# Patient Record
Sex: FEMALE | Race: WHITE | Employment: FULL TIME | URBAN - METROPOLITAN AREA
[De-identification: names, ages, dates, MRNs, and addresses within clinical notes are randomized per-mention and may not be internally consistent; named-entity substitution may affect disease eponyms.]

---

## 2018-09-27 LAB
HPV ABSTRACT: ABNORMAL
PAP-ABSTRACT: NORMAL

## 2018-10-15 ENCOUNTER — TRANSFERRED RECORDS (OUTPATIENT)
Dept: HEALTH INFORMATION MANAGEMENT | Facility: CLINIC | Age: 43
End: 2018-10-15

## 2019-01-30 ENCOUNTER — TELEPHONE (OUTPATIENT)
Dept: OBGYN | Facility: CLINIC | Age: 44
End: 2019-01-30

## 2019-01-30 NOTE — TELEPHONE ENCOUNTER
Mike is a brand new patient to [facility] and would like her MIRENA removed and a new one re-inserted.  Please call to schedule at the [facility].  Thank you

## 2019-01-30 NOTE — TELEPHONE ENCOUNTER
LMTC regarding appointment for IUD removal and re-insert with JESSY Baron, CNP in [place].Cheryl Vergara RN on 1/30/2019 at 4:32 PM

## 2019-01-30 NOTE — TELEPHONE ENCOUNTER
Received phone call from pt. Appointment scheduled with JESSY Baron, CNP for 2/11/2019. Cheryl Vergara RN on 1/30/2019 at 4:56 PM

## 2019-02-11 ENCOUNTER — OFFICE VISIT (OUTPATIENT)
Dept: OBGYN | Facility: CLINIC | Age: 44
End: 2019-02-11
Payer: COMMERCIAL

## 2019-02-11 VITALS
TEMPERATURE: 98.4 F | DIASTOLIC BLOOD PRESSURE: 87 MMHG | WEIGHT: 161 LBS | BODY MASS INDEX: 26.82 KG/M2 | SYSTOLIC BLOOD PRESSURE: 139 MMHG | HEIGHT: 65 IN | HEART RATE: 78 BPM

## 2019-02-11 DIAGNOSIS — Z98.890 HISTORY OF AUGMENTATION OF BOTH BREASTS: ICD-10-CM

## 2019-02-11 DIAGNOSIS — Z30.433 ENCOUNTER FOR REMOVAL AND REINSERTION OF IUD: Primary | ICD-10-CM

## 2019-02-11 LAB — BETA HCG QUAL IFA URINE: NEGATIVE

## 2019-02-11 PROCEDURE — 84703 CHORIONIC GONADOTROPIN ASSAY: CPT | Performed by: NURSE PRACTITIONER

## 2019-02-11 PROCEDURE — 58300 INSERT INTRAUTERINE DEVICE: CPT | Performed by: NURSE PRACTITIONER

## 2019-02-11 PROCEDURE — 58301 REMOVE INTRAUTERINE DEVICE: CPT | Performed by: NURSE PRACTITIONER

## 2019-02-11 RX ORDER — LEVOTHYROXINE SODIUM 112 UG/1
112 TABLET ORAL
COMMUNITY
Start: 2018-10-16 | End: 2019-02-11

## 2019-02-11 ASSESSMENT — MIFFLIN-ST. JEOR: SCORE: 1376.17

## 2019-02-11 NOTE — Clinical Note
Please abstract the following data from this visit with this patient into the appropriate field in Epic:Mammogram done on this date: 10/15/18 (approximately), by this group: [facility], results were normal. Pap smear done on this date: 9/27/18 (approximately), by this group: [facility], results were NIL and HR HPV + type other.

## 2019-02-11 NOTE — PROGRESS NOTES
Mike Soler is a 43 year old year old who presents today requesting removal of her current IUD and placement of a Mirena IUD. Unsure exact date her current IUD is due for removal. Has abstained for 3 weeks at this time.  Also, history of bilateral breast augmentation. Would like referral to Plastic Surgeon as she would like to look at having the, replaced.    The patient meets and is agreeable to the following conditions:  She is not interested in conception in the near future.   She currently is in a stable, monogamous relationship.   There is no previous history of pelvic inflammatory disease.   There is no previous history of ectopic pregnancy.   She is willing to check monthly for the IUD string.   There is no history of unresolved abnormal uterine bleeding.   There is no history of an unresolved abnormal PAP smear.   She has no history of Moisés's disease or an allergy to copper (for Paragu[place]).   She has had a PAP smear within the ACOG screening guideline.   She denies the possibility of pregnancy.     We discussed risks, benefits, and alternatives including but not limited to:   Possibility of pregnancy and ectopic pregnancy.  Possibility of pelvic inflammatory disease, particularly with new partners.  Risk of uterine perforation or IUD expulsion.  Possibility of difficult removal.  Spotting or heavy bleeding.  Cramping, pain or infection during or after insertion.    The patient was given patient information on the IUD and the patient education brochure from the .  The patient has given consent to proceed with removal of and new placement of the IUD.  She wishes to proceed.  All questions answered.  UPT: Negative    PROCEDURE:    Type of IUD: Mirena    The patient has taken 600 mg of Ibuprofen prior to the procedure. She is placed in a dorsal lithotomy position and a pelvic exam is performed to determine the position of the uterus.  The cervix is identified and the IUD easily removed intact  with a ring forceps. Pt shown the intact IUD.  Cervix is then cleaned with betadine. A single tooth tenaculum is applied to the anterior lip of the cervix for stabilization. The uterus sounded to 7.5 cm. (Target sound depth is 6.5 cm to 8.5 cm.) The IUD insertion tube is prepared to manufacturers recommendations and inserted into the uterus under sterile conditions in the usual fashion. The IUD string is then cut to 2.5 cm.    The patient tolerated this procedure without immediate complication.  The patient is to return or call immediately for any unexplained fever, abdominal or pelvic pain, excessive bleeding, possibility of pregnancy, foul-smelling discharge, sense that the IUD has been expelled.  All questions were answered.    Return to clinic in 1 month for IUD check  Lot#: [id number]  Exp: 06/2021  NDC#: 12914-618-65    Plastic surgery referral entered.    Norma JIMÉNEZ CNP

## 2021-05-20 NOTE — LETTER
"                                                                          Affirmation of Informed Consent for Surgery or Invasive Procedure    1.  I, (print patient's name) Mike Soler,  1975,   a.  Agree that I will have (include both the medical term and patient words):           Chief Complaint   Patient presents with     IUD     removal/ insertion      b.  At [facility].     c.  The reason for this procedure is (medical condition):  iud removal and placement.   d.  This will be done or supervised by:  JESSY Pope CNP.   e.  My doctor may have help from others.  Help could include opening or closing         the wound. Help might also include taking grafts, cutting out tissue,                           implanting devices.  I have been told who will help, if known.     2.  I have talked to my doctor or health care team about:   a.  What the procedure is and what will happen.   b   How it may help me (the benefits).   c.  How it may harm me (the most likely and most serious risks).   d.  The long-term effects the procedure might have.    e.  My other choices for treatment.  The risks and benefits of those choices.    f.   What will likely happen if I say \"no\" to this procedure.    g.  How I might feel right after and how quickly I might be expected to recover.      h.  What medicines will be used to manage pain or sedate me.     3.  I agree that:  (If I do not agree with a statement, I have crossed it out and              initialed next to it.)       a.  I will ask questions.     b.  No one has promised me definite results.    c.  If serious problems are found during the procedure, the treatment may                    change.   d.  If I have \"do not resuscitate\" (DNR) wishes, I have discussed this with my                              doctor and they will be put on hold during the procedure.   e. Students and other appropriate people, approved by the facility, may watch                "       the procedure and help with tasks they are qualified for.                                                    f.  Pictures or videos may be taken. They may be used for medical or                  educational reasons only.       g. Tissues or organs removed from my body as part of the normal course of the                    procedure may be used for research or teaching purposes. They will be                  disposed of with respect.                    h.  If a staff person is exposed to my blood or body fluids, my blood will be drawn                   and tested for HIV and hepatitis.  I understand that by law, the test results will                    go:         -  In my medical record.                         -  To the Employee Occupational Health Service and/or Infection Control                                  at this facility.    -  To the [place] [facility] officials.                 i.  I may have a blood transfusion: I have talked to my doctor or care team about:    -  Why I may need a blood transfusion.     - The risks, benefits, and side effects of transfusion - and the risks of not        Having one.     -  Blood safety and other options for treatment.        Consent for blood transfusion obtained during a hospital admission is valid for the entire hospital stay.  Consent  for blood transfusion obtained in the clinic setting is valid for a year from the signature date.                                4.  I understand that:   a.  I can change my mind.  If I do, I must tell my doctor or team as soon as                           possible.              b.  The team members may change during the procedure.                c.   The team will double-check who I am.  They will ask me what I am having                         done and the site of the site of the procedure.  This is done for my safety.    My questions have been answered.  I agree to the procedure.  I have made my special needs and instructions  known.      Mike Soler      2/11/2019 8:41 AM  Patient (or representative)/Relationship to patient             Date  Time        Witness:  I have verified that the signature is that of the patient or patient's representative and that this has been signed before the procedure:    NAME:        2/11/2019  8:41 AM         Date  Time  Person verifying patient's name or patient representative's signature     Provider:  I have discussed the procedure and the information stated above with the patient (or the patient's representative) and answered their questions. The patient or their representative consented to the procedure:      JESSY Pope CNP    2/11/2019  8:41 AM  Physician or Provider's Signature(s)   Date  Time         Consent for procedure valid for 30 days after patient signature date     [facility]  AFFIRMATION OF INFORMED CONSENT FOR SURGERY OR INVASIVE PROCEDURE               Original - Medical Records         Name band;

## 2021-07-10 ENCOUNTER — OFFICE VISIT (OUTPATIENT)
Dept: URGENT CARE | Facility: URGENT CARE | Age: 46
End: 2021-07-10
Payer: COMMERCIAL

## 2021-07-10 VITALS
OXYGEN SATURATION: 99 % | DIASTOLIC BLOOD PRESSURE: 83 MMHG | TEMPERATURE: 98 F | HEART RATE: 92 BPM | SYSTOLIC BLOOD PRESSURE: 127 MMHG

## 2021-07-10 DIAGNOSIS — S61.112A LACERATION OF LEFT THUMB WITHOUT FOREIGN BODY WITH DAMAGE TO NAIL, INITIAL ENCOUNTER: Primary | ICD-10-CM

## 2021-07-10 PROCEDURE — 99203 OFFICE O/P NEW LOW 30 MIN: CPT | Performed by: FAMILY MEDICINE

## 2021-07-11 NOTE — PROGRESS NOTES
Assessment & Plan     Laceration of left thumb without foreign body with damage to nail, initial encounter  45-year-old female presented with left thumb injury, sustained small laceration of distal left thumb skin and nail plate while cutting accidentally with a knife.  Degloved to skin and nail plate removed with the help of scissor, and Surgifoam applied for bleeding control.  Appropriate dressing applied.  Tetanus vaccine up-to-date.  Home care discussed and written information provided.  Patient understood and in agreement with above plan.  All questions answered.       Patient Instructions     Patient Education     Absorbable Gelatin Dressing Change   An absorbable gelatin sponge is a material used in fresh open wounds to stop bleeding. It's put directly on the base of the wound and helps the blood form a clot. Another bandage is put on top of the dressing to protect it and keep it in place.   The material that touches the wound base will dissolve or fall off with the scab. Any material that s left may be taken off during a follow-up visit.    Home care  These guidelines will help you care for your wound at home:  Dressing care  Keep the dressing dry until the next dressing change or visit with your healthcare provider. Bathe with your dressing out of the water, protected with a large, rubber-banded or taped plastic bag, if it's on an extremity. Be careful that the rubber bands are not too tight, cutting off circulation. If the dressing is can't be covered with a bag, you may need to only take a sponge bath around it. If the dressing becomes wet, it will need to be changed.  Changing the dressing  If you were advised to change the dressing at home:    Wash your hands.    Remove the outer bandage covering the absorbable dressing.    The outer bandage might stick to the absorbable dressing because of blood in the bandage. If that happens, gently run warm water over the dressing until the dried blood softens and  you can peel the outer bandage away. Be careful not to pull the absorbable dressing off the wound.     If the warm water method alone doesn't work to loosen the bandage, you may pour hydrogen peroxide over the dressing. This will help soften the dried blood.     If this doesn t work and you are having trouble, return to this facility and let us replace the dressing for you.    After you have removed the bandage, rinse the wound area with soap and water. Look at the area around the wound for redness, swelling, or pus.      Put an antibiotic ointment over the absorbable dressing to keep it from sticking to the new bandage. Put on another bandage or large adhesive bandage.  Other precautions    No tub baths or swimming until the bandage is removed and the wound is healed. This will take at least 7 days.    If you were given an appointment for wound check or dressing change, be sure to keep this appointment.      Follow-up care  Follow up with your healthcare provider. Most open wounds heal in 10 to 14 days. But even with proper treatment, a wound infection may sometimes occur. Be sure to check the wound every day for any signs of infection listed below.  When to seek medical advice  Call your healthcare provider right away if any of these occur:    Pain in the wound gets worse    Redness, swelling, or pus coming from the wound    Fever of 100.4 F (38 C) or higher, or as directed by your healthcare provider    Bleeding that can t be easily controlled by putting direct pressure on the wound  [employer] last reviewed this educational content on 9/1/2019 2000-2021 [employer]. All rights reserved. This information is not intended as a substitute for professional medical care. Always follow your healthcare professional's instructions.               Kip Sanders MD  [facility] [place]    Vania Mckeon is a 45 year old who presents for the following health issues     HPI     Concern  -   Onset: this evening  Description: laceration to thumb with kitchen knife this evening  Intensity: moderate  Therapies tried and outcome: icing, pressure       Review of Systems   Constitutional, HEENT, cardiovascular, pulmonary, gi and gu systems are negative, except as otherwise noted.      Objective    /83   Pulse 92   Temp 98  F (36.7  C) (Tympanic)   SpO2 99%   There is no height or weight on file to calculate BMI.  Physical Exam   GENERAL: alert and no distress  EYES: Eyes grossly normal to inspection  MS: no gross musculoskeletal defects noted, no edema  SKIN: small degloved skin and distal left thumb nail plate with some blood oozing, ROM normal,  sensation to touch and pressure intact  NEURO: Normal strength and tone, mentation intact and speech normal  PSYCH: mentation appears normal, affect normal/bright

## 2021-07-11 NOTE — PATIENT INSTRUCTIONS
Patient Education     Absorbable Gelatin Dressing Change   An absorbable gelatin sponge is a material used in fresh open wounds to stop bleeding. It's put directly on the base of the wound and helps the blood form a clot. Another bandage is put on top of the dressing to protect it and keep it in place.   The material that touches the wound base will dissolve or fall off with the scab. Any material that s left may be taken off during a follow-up visit.    Home care  These guidelines will help you care for your wound at home:  Dressing care  Keep the dressing dry until the next dressing change or visit with your healthcare provider. Bathe with your dressing out of the water, protected with a large, rubber-banded or taped plastic bag, if it's on an extremity. Be careful that the rubber bands are not too tight, cutting off circulation. If the dressing is can't be covered with a bag, you may need to only take a sponge bath around it. If the dressing becomes wet, it will need to be changed.  Changing the dressing  If you were advised to change the dressing at home:    Wash your hands.    Remove the outer bandage covering the absorbable dressing.    The outer bandage might stick to the absorbable dressing because of blood in the bandage. If that happens, gently run warm water over the dressing until the dried blood softens and you can peel the outer bandage away. Be careful not to pull the absorbable dressing off the wound.     If the warm water method alone doesn't work to loosen the bandage, you may pour hydrogen peroxide over the dressing. This will help soften the dried blood.     If this doesn t work and you are having trouble, return to this facility and let us replace the dressing for you.    After you have removed the bandage, rinse the wound area with soap and water. Look at the area around the wound for redness, swelling, or pus.      Put an antibiotic ointment over the absorbable dressing to keep it from  sticking to the new bandage. Put on another bandage or large adhesive bandage.  Other precautions    No tub baths or swimming until the bandage is removed and the wound is healed. This will take at least 7 days.    If you were given an appointment for wound check or dressing change, be sure to keep this appointment.      Follow-up care  Follow up with your healthcare provider. Most open wounds heal in 10 to 14 days. But even with proper treatment, a wound infection may sometimes occur. Be sure to check the wound every day for any signs of infection listed below.  When to seek medical advice  Call your healthcare provider right away if any of these occur:    Pain in the wound gets worse    Redness, swelling, or pus coming from the wound    Fever of 100.4 F (38 C) or higher, or as directed by your healthcare provider    Bleeding that can t be easily controlled by putting direct pressure on the wound  [employer] last reviewed this educational content on 9/1/2019 2000-2021 [employer]. All rights reserved. This information is not intended as a substitute for professional medical care. Always follow your healthcare professional's instructions.